# Patient Record
Sex: MALE | Race: WHITE | NOT HISPANIC OR LATINO | Employment: FULL TIME | URBAN - METROPOLITAN AREA
[De-identification: names, ages, dates, MRNs, and addresses within clinical notes are randomized per-mention and may not be internally consistent; named-entity substitution may affect disease eponyms.]

---

## 2021-03-16 ENCOUNTER — OFFICE VISIT (OUTPATIENT)
Dept: OBGYN CLINIC | Facility: MEDICAL CENTER | Age: 36
End: 2021-03-16
Payer: COMMERCIAL

## 2021-03-16 VITALS
WEIGHT: 196 LBS | HEIGHT: 61 IN | SYSTOLIC BLOOD PRESSURE: 144 MMHG | DIASTOLIC BLOOD PRESSURE: 78 MMHG | BODY MASS INDEX: 37 KG/M2 | HEART RATE: 77 BPM

## 2021-03-16 DIAGNOSIS — M54.6 ACUTE BILATERAL THORACIC BACK PAIN: Primary | ICD-10-CM

## 2021-03-16 DIAGNOSIS — M54.2 ACUTE NECK PAIN: ICD-10-CM

## 2021-03-16 DIAGNOSIS — M47.814 THORACIC SPONDYLOSIS: ICD-10-CM

## 2021-03-16 PROCEDURE — 99203 OFFICE O/P NEW LOW 30 MIN: CPT | Performed by: STUDENT IN AN ORGANIZED HEALTH CARE EDUCATION/TRAINING PROGRAM

## 2021-03-16 RX ORDER — METHYLPREDNISOLONE 4 MG/1
TABLET ORAL
COMMUNITY
Start: 2021-03-15 | End: 2022-02-10

## 2021-03-16 RX ORDER — LISINOPRIL AND HYDROCHLOROTHIAZIDE 25; 20 MG/1; MG/1
1 TABLET ORAL DAILY
COMMUNITY
Start: 2021-03-10 | End: 2022-02-10

## 2021-03-16 RX ORDER — TIZANIDINE 4 MG/1
TABLET ORAL
COMMUNITY
Start: 2021-03-15

## 2021-03-16 RX ORDER — ALPRAZOLAM 0.5 MG/1
TABLET ORAL
COMMUNITY
Start: 2021-03-06

## 2021-03-16 RX ORDER — MELOXICAM 15 MG/1
15 TABLET ORAL DAILY
Qty: 30 TABLET | Refills: 1 | Status: SHIPPED | OUTPATIENT
Start: 2021-03-16 | End: 2021-06-04 | Stop reason: SDUPTHER

## 2021-03-16 RX ORDER — ZOLPIDEM TARTRATE 10 MG/1
TABLET ORAL
COMMUNITY
Start: 2021-03-06

## 2021-03-16 NOTE — LETTER
March 16, 2021     Patient: Lei Jameson   YOB: 1985   Date of Visit: 3/16/2021       To Whom it May Concern:    Lei Jameson is under my professional care  He was seen in my office on 3/16/2021  He is restricted to 20 pound weight restriction of lifting/pushing/pulling/carrying until cleared by a physician  Please restrict him to 8 hour work days  Patient will be re-evaluated in 2-3 weeks  If you have any questions or concerns, please don't hesitate to call           Sincerely,          Robert Graves MD        CC: Lei Jameson

## 2021-03-16 NOTE — PROGRESS NOTES
1  Acute bilateral thoracic back pain  Ambulatory referral to [facility]    Nerve Stimulator (TENS Therapy Pain Relief) [facility]    meloxicam (MOBIC) 15 mg tablet   2  Acute neck pain  Ambulatory referral to [facility]    Nerve Stimulator (TENS Therapy Pain Relief) [facility]    meloxicam (MOBIC) 15 mg tablet   3  Thoracic spondylosis  Ambulatory referral to [facility]    Nerve Stimulator (TENS Therapy Pain Relief) [facility]    meloxicam (MOBIC) 15 mg tablet     Orders Placed This Encounter   Procedures    Ambulatory referral to [facility]        Imaging Studies (I personally reviewed images in PACS and report):     prior imagin  X-ray cervical spine 03/15/2021: ( images are available without report):  No acute osseous abnormalities  2  X-ray thoracic spine 03/15/2021: (images available w/o report):  Multilevel thoracic spondylosis changes as evidenced by mild anterior spur formation  Minimal dextroscoliosis of the midthoracic spine is evident  IMPRESSION:  Acute on chronic axial thoracic and cervical neck pain  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  Multilevel thoracic mild spondylosis    Contributing factors: Occupation, BMI 37    PLAN:  - Clinical exam and prior imaging reviewed with patient and his significant other today, with impression as per above  I discussed pathophysiology and treatment options in regards to back pain as per patient instructions  Patient was agreeable to referral to formal comprehensive spine physical therapy  In regards to pain control, I have changed his NSAIDs to meloxicam 15 mg p o  q d  p r n  pain  I counseled he can concurrently take acetaminophen 500-1000 mg q 6 -8 hours  Additionally I have also prescribed him a TENs machine   -  Work note provided with right restrictions as per communications  Patient will follow-up in 3 weeks for re-evaluation and potential update of work restrictions    I counseled patient that formal physical therapy of at least 6-8 weeks would be required  Return in about 3 weeks (around 4/6/2021)  CHIEF COMPLAINT:  Back pain    HPI:  Gricelda Davis is a 29 y o  male  who presents with his significant other for       Visit 03/16/2021 :  Initial evaluation back pain: patient reports has been an ongoing issue for several years but recently worsened in the past 1 week  He attributes it to an increase of baseline lifting activities involving housework with frequent lifting and twisting of his back  In addition his work also involves frequent lifting for several hours a day  He admits he always has chronic back pain but became much more severe and is located over the cervical and thoracic aspects of his back  Pain described as sharp, constant, moderate to severe intensity, nonradiating  Pain is aggravated with any twisting /bending of his back as well as lifting  He denies any numbness / tingling, weakness, bowel/ bladder incontinence, and other ROS as per below  He reports going to an urgent care in which he was prescribed a Medrol Dosepak and muscle relaxers (tizanidine) -   He reports completing the Medrol Dosepak and feels that he has slightly less intensity of pain and slightly increased back range of motion since then  He reports no relief with the muscle relaxers  He has never seen a formal PT for this issue before  Pain can interfere with his sleep at night  Review of Systems   Constitutional: Negative for chills, fever and unexpected weight change  HENT: Negative for sore throat  Respiratory: Negative for cough, shortness of breath and wheezing  Gastrointestinal: Negative for abdominal pain, nausea and vomiting  Denies bowel incontinence   Genitourinary:        Denies urinary incontinence   Musculoskeletal:        As per HPI   Skin: Negative for rash     Neurological:        As per HPI         Medical, Surgical, Family, and Social History    Past Medical History:   Diagnosis Date    Anxiety     Hypertension     Insomnia      Past Surgical History:   Procedure Laterality Date    APPENDECTOMY       Social History   Social History     Substance and Sexual Activity   Alcohol Use None     Social History     Substance and Sexual Activity   Drug Use Not on file     Social History     Tobacco Use   Smoking Status Current Every Day Smoker   Smokeless Tobacco Never Used   Tobacco Comment    vapes     History reviewed  No pertinent family history  No Known Allergies       Physical Exam  /78   Pulse 77   Ht 5' 1" (1 549 m)   Wt 88 9 kg (196 lb)   BMI 37 03 kg/m²     Constitutional:  see vital signs  Gen: well-developed, normocephalic/atraumatic, well-groomed  Eyes: No inflammation or discharge of conjunctiva or lids; sclera clear   Pharynx: no inflammation, lesion, or mass of lips  Neck: supple, no masses, non-distended  MSK: no inflammation, lesion, mass, or clubbing of nails and digits except for other than mentioned below  SKIN: no visible rashes or skin lesions  Pulmonary/Chest: Effort normal  No respiratory distress     NEURO: cranial nerves grossly intact  PSYCH:  Alert and oriented to person, place, and time; recent and remote memory intact; mood normal, no depression, anxiety, or agitation, judgment and insight good and intact     Ortho Exam  BACK EXAM:  Gait: normal, no trendelenberg gait, no antalgic gait    BACK TENDERNESS:  Spinous Processes: +C6, T1-T10  Paraspinal Muscles: +paracervical bilaterally, +parathoracic  SI Joint: no  Sacrum: no    ROM:  Flexion:45, aggravate back pain  Extension: 10, aggravates back pain  Lateral flexion: 20 bilaterally, aggravates back pain  Rotation: 15 bilaterally, aggravates back pain    DERMATOMAL SENSATION:  L1: normal   L2: normal   L3: normal   L4: normal   L5: normal   S1: normal    STRENGTH (bilateral):  Knee Extension: 5/5  Knee Flexion: 5/5  Foot Dorsiflexion: 5/5  Great Toe Extension: 5/5  Foot Plantarflexion: 5/5  Hip Flexion: 5/5  Hip Abduction: 5/5    REFLEXES:  Patellar: 2+ bilateral  Achilles: 2+ bilateral  Clonus: negative bilateral    BACK:   SUPINE STRAIGHT LEG: negative    HIP:  LOG ROLL: negative  GWEN: negative  FADIR: negative    Cervical  ROM: intact, but paracervical pain aggravated with neck flexion/extension/lateral flexion/rotation  Sensation UE Bilateral:  C5: normal  C6: normal  C7: normal  C8: normal  T1: normal  Strength UE: 5/5 elbow, wrist, fingers bilateral  Spurlings: negative

## 2021-03-25 ENCOUNTER — EVALUATION (OUTPATIENT)
Dept: PHYSICAL THERAPY | Facility: CLINIC | Age: 36
End: 2021-03-25
Payer: COMMERCIAL

## 2021-03-25 DIAGNOSIS — M47.814 THORACIC SPONDYLOSIS: ICD-10-CM

## 2021-03-25 DIAGNOSIS — M54.2 ACUTE NECK PAIN: ICD-10-CM

## 2021-03-25 DIAGNOSIS — M54.6 ACUTE BILATERAL THORACIC BACK PAIN: ICD-10-CM

## 2021-03-25 PROCEDURE — 97161 PT EVAL LOW COMPLEX 20 MIN: CPT | Performed by: PHYSICAL THERAPIST

## 2021-03-25 PROCEDURE — 97112 NEUROMUSCULAR REEDUCATION: CPT | Performed by: PHYSICAL THERAPIST

## 2021-03-25 NOTE — PROGRESS NOTES
PT Evaluation     Today's date: 3/25/2021  Patient name: Monica Clark  : 1985  MRN: [id number]  Referring provider: Alexis Murguia MD  Dx:   Encounter Diagnosis     ICD-10-CM    1  Acute bilateral thoracic back pain  M54 6 Ambulatory referral to [facility]   2  Acute neck pain  M54 2 Ambulatory referral to [facility]   3  Thoracic spondylosis  M47 814 Ambulatory referral to [facility]       Start Time: 1615  Stop Time: 1700  Total time in clinic (min): 45 minutes    Assessment  Assessment details: 29year old male patient reports to PT with acute on chronic thoracic and cervical pain  No red flags noted and patient denies numbness/tingling  Patient primary movement impairments related to his symptoms are decreased upper cervical mobility, and decreased upper and mid thoracic mobility  Patient also has decreased scapular/postural musculature strength  Patient presents to physical therapy through the [facility] comprehensive spine program and has scored moderate risk on the Start Back tool  He will benefit from skilled PT services involving mobilization and motor coordination for 2x a week for 3-4 weeks  Patient was given exercises to address his mobility impairments and educated on importance of trying to maintain good posture and body mechanics  Impairments: abnormal or restricted ROM, abnormal movement, activity intolerance, impaired physical strength, lacks appropriate home exercise program, pain with function, poor posture  and poor body mechanics    Symptom irritability: moderateUnderstanding of Dx/Px/POC: good   Prognosis: good    Goals  STG  1  Patient will be independent with completion of HEP throughout therapy  2  Patient will have at worst 7/10 pain so patient can sleep with less discomfort in 3 weeks  3  Patient will increase R cervical rotation to at least minimal restriction so patient doesn't compensate with thoracic region in 3 weeks  LTG  1   Patient will bend over without increase in symptoms in 4 weeks  2  Patient will lift without increase in symptoms in 6 weeks  Plan  Patient would benefit from: skilled physical therapy  Planned therapy interventions: joint mobilization, manual therapy, abdominal trunk stabilization, neuromuscular re-education, patient education, strengthening, stretching, body mechanics training, therapeutic activities, therapeutic exercise, home exercise program, flexibility and functional ROM exercises  Frequency: 2x week  Duration in weeks: 6  Treatment plan discussed with: patient        Subjective Evaluation    History of Present Illness  Mechanism of injury: Patient reports with thoracic and neck pain that is chronic in nature but has worsened over the last couple of weeks due to increased workload  Patient notes bending, lifting, and twisting seems to worsen his symptoms  Patient denies numbness/tingling  Patient also has difficulty sleeping  Patient also has difficulty turning his neck  Patient is unable to complete his job fully due to his symptoms  Patient also has difficulty sitting for prolonged periods  Patient would like to get back to playing hockey  Patient notes when sits with lumbar support makes his symptoms better  Pain  Current pain ratin  At best pain ratin  At worst pain ratin  Quality: dull ache and sharp  Relieving factors: change in position and medications  Aggravating factors: lifting and sitting    Treatments  Current treatment: medication and physical therapy  Patient Goals  Patient goals for therapy: decreased pain, increased motion and return to sport/leisure activities          Objective     Concurrent Complaints  Positive for disturbed sleep   Negative for night pain, dizziness, faints, headaches and nausea/motion sickness    Active Range of Motion   Cervical/Thoracic Spine       Cervical    Flexion:  WFL  Extension:  with pain Restriction level: minimal  Left rotation:  [facility] rotation:  with pain Restriction level: moderate    Thoracic    Flexion:  WFL  Extension:  with pain Restriction level: moderate  Left rotation:  Restriction level: moderate  Right rotation:  with pain Restriction level: moderate    Strength/Myotome Testing     Left Shoulder     Isolated Muscles   Lower trapezius: 3+   Middle trapezius: 3+   Serratus anterior: 3+     Right Shoulder     Isolated Muscles   Lower trapezius: 3+   Middle trapezius: 3+   Serratus anterior: 3+     General Comments:      Cervical/Thoracic Comments  Thoracic flexion AROM - less movement upper thoracic region  Hypomobile in upper and mid thoracic regions     R C1/C2 hypomobile     B shoulder PROM full and painless   Neuro Exam:     Headaches   Patient reports headaches: No               Precautions: R A/C joint sprain       Manuals 3/25            R upper cervical mobs MK            Thoracic mobs MK                                      Neuro Re-Ed             Chin tucks                                                                                           Ther Ex             R C1/C2 snag r            Supine thoracic extension over horizontal r            Prone low trap retraction r                         UBE             Supine thoracic ext over vert w/ B UE movements             Seated CT butterfly stretch              Prone mid trap T's              Ther Activity                                       Gait Training                                       Modalities

## 2021-03-30 ENCOUNTER — OFFICE VISIT (OUTPATIENT)
Dept: PHYSICAL THERAPY | Facility: CLINIC | Age: 36
End: 2021-03-30
Payer: COMMERCIAL

## 2021-03-30 DIAGNOSIS — M47.814 THORACIC SPONDYLOSIS: ICD-10-CM

## 2021-03-30 DIAGNOSIS — M54.6 ACUTE BILATERAL THORACIC BACK PAIN: Primary | ICD-10-CM

## 2021-03-30 DIAGNOSIS — M54.2 ACUTE NECK PAIN: ICD-10-CM

## 2021-03-30 PROCEDURE — 97140 MANUAL THERAPY 1/> REGIONS: CPT | Performed by: PHYSICAL THERAPIST

## 2021-03-30 PROCEDURE — 97110 THERAPEUTIC EXERCISES: CPT | Performed by: PHYSICAL THERAPIST

## 2021-03-30 NOTE — PROGRESS NOTES
Daily Note     Today's date: 3/30/2021  Patient name: Rian Reilly  : 1985  MRN: [id number]  Referring provider: Dennis Shay MD  Dx:   Encounter Diagnosis     ICD-10-CM    1  Acute bilateral thoracic back pain  M54 6    2  Acute neck pain  M54 2    3  Thoracic spondylosis  M47 814        Start Time: 1750          Subjective: Patient notes not as much pain in his mid back but also hasn't been doing as much physically at work  Objective: See treatment diary below      Assessment: Tolerated treatment well  Patient would benefit from continued PT  Treatment plan initiated for increasing cervical and thoracic mobility  Patient had relief with manuals  Plan: Progress treatment as tolerated         Precautions: R A/C joint sprain       Manuals 3/25 3/30            R upper cervical mobs [address] MK           Thoracic mobs MK supine distraction Gr V 2x, seated CT 1x w/ cav                                     Neuro Re-Ed             Chin tucks                                                                                           Ther Ex             R C1/C2 snag r            Supine thoracic extension over horizontal r            Prone low trap retraction r 20x 5" holds                         UBE  5 min bwd            Supine thoracic ext over vert w/ B UE movements  10x bear hugs, 10x snow angels, 10x punches            Seated CT butterfly stretch   10x 5" holds            Prone mid trap T's              Thoracic rotations  10x B           Ther Activity                                       Gait Training                                       Modalities

## 2021-04-06 ENCOUNTER — APPOINTMENT (OUTPATIENT)
Dept: PHYSICAL THERAPY | Facility: CLINIC | Age: 36
End: 2021-04-06
Payer: COMMERCIAL

## 2021-04-13 ENCOUNTER — OFFICE VISIT (OUTPATIENT)
Dept: PHYSICAL THERAPY | Facility: CLINIC | Age: 36
End: 2021-04-13
Payer: COMMERCIAL

## 2021-04-13 DIAGNOSIS — M54.6 ACUTE BILATERAL THORACIC BACK PAIN: Primary | ICD-10-CM

## 2021-04-13 DIAGNOSIS — M54.2 ACUTE NECK PAIN: ICD-10-CM

## 2021-04-13 DIAGNOSIS — M47.814 THORACIC SPONDYLOSIS: ICD-10-CM

## 2021-04-13 PROCEDURE — 97110 THERAPEUTIC EXERCISES: CPT | Performed by: PHYSICAL THERAPIST

## 2021-04-13 PROCEDURE — 97112 NEUROMUSCULAR REEDUCATION: CPT | Performed by: PHYSICAL THERAPIST

## 2021-04-13 NOTE — PROGRESS NOTES
Daily Note     Today's date: 2021  Patient name: Ivonne Crowley  : 1985  MRN: [id number]  Referring provider: Elin Ahumada, MD  Dx:   Encounter Diagnosis     ICD-10-CM    1  Acute bilateral thoracic back pain  M54 6    2  Acute neck pain  M54 2    3  Thoracic spondylosis  M47 814        Start Time: 1750          Subjective: Patient notes woke up wrong and has pain in L side of neck today  Notes has been busy at work and more sore but his usual sore  Notes as week goes on he gets more sore  Notes usually works with B UEs overhead or at shoulder level a lot  Objective: See treatment diary below    L UT tightness  minimal  L pec tightness minimal  L lat tightness minimal    Assessment: Tolerated treatment well  Patient would benefit from continued PT  Patient has full shoulder PROM B and full cervical ROM B except for minimal limitation in R cervical rotation  Patient had some pain with R thoracic rotation and limited in that direction but improved with AROM exercise  Most likely would also benefit from progressive overload strengthening as patient symptoms increase as week progresses at work  Plan: Progress treatment as tolerated         Precautions: R A/C joint sprain       Manuals 3/25 3/30  4/13          R upper cervical mobs [address] MK           Thoracic mobs MK supine distraction Gr V 2x, seated CT 1x w/ cav supine distraction Gr V 2x, seated CT 1x w/ cav                                    Neuro Re-Ed             Chin tucks                                                                                           Ther Ex             R C1/C2 snag r            Supine thoracic extension over horizontal r            Prone low trap retraction r 20x 5" holds                         UBE  5 min bwd  5 min bwd          Supine thoracic ext over vert w/ B UE movements  10x bear hugs, 10x snow angels, 10x punches  pec stretch 1 min B UEs out           Seated CT butterfly stretch   10x 5" holds  12x 5"holds          Prone mid trap T's              Thoracic rotations  10x B 12x           Prone press ups   10x           víctor pose   3x 20" holds           Ther Activity                                       Gait Training                                       Modalities

## 2021-04-15 ENCOUNTER — OFFICE VISIT (OUTPATIENT)
Dept: PHYSICAL THERAPY | Facility: CLINIC | Age: 36
End: 2021-04-15
Payer: COMMERCIAL

## 2021-04-15 DIAGNOSIS — M47.814 THORACIC SPONDYLOSIS: ICD-10-CM

## 2021-04-15 DIAGNOSIS — M54.2 ACUTE NECK PAIN: ICD-10-CM

## 2021-04-15 DIAGNOSIS — M54.6 ACUTE BILATERAL THORACIC BACK PAIN: Primary | ICD-10-CM

## 2021-04-15 PROCEDURE — 97112 NEUROMUSCULAR REEDUCATION: CPT

## 2021-04-15 PROCEDURE — 97110 THERAPEUTIC EXERCISES: CPT

## 2021-04-15 PROCEDURE — 97140 MANUAL THERAPY 1/> REGIONS: CPT

## 2021-04-15 NOTE — PROGRESS NOTES
Daily Note     Today's date: 4/15/2021  Patient name: Petros Sorenson  : 1985  MRN: [id number]  Referring provider: Teresa Valencia MD  Dx:   Encounter Diagnosis     ICD-10-CM    1  Acute bilateral thoracic back pain  M54 6    2  Acute neck pain  M54 2    3  Thoracic spondylosis  M47 814                   Subjective: Pt reports that his mid-back is still bothering him, his low back feels fine, and the L side of his neck hurts but he thinks he has lymph node swelling there and under arm, though he denies any other symptoms  Objective: See treatment diary below     Precautions: R A/C joint sprain     Manuals 3/25 3/30  4/13 4/15         R upper cervical mobs [facility] MK           Thoracic mobs MK supine distraction Gr V 2x, seated CT 1x w/ cav supine distraction Gr V 2x, seated CT 1x w/ cav seated CT 1x w/ cav         STM/SOR    [address]                      Neuro Re-Ed    4/15         Chin tucks    5"x20                                   Ther Ex    4/15         R C1/C2 snag r            Supine thoracic extension over horizontal r            Prone low trap retraction r 20x 5" holds   5"x20                      UBE  5 min bwd  5 min bwd 5' BW         Supine thoracic ext over vert w/ B UE movements  10x bear hugs, 10x snow angels, 10x punches  pec stretch 1 min B UEs out  1' ea:  pec stretch  hugs,  Snow helen  scissors  5"x20: punches         Seated CT butterfly stretch   10x 5" holds  12x 5"holds 5"x12         Prone mid trap T's     nv         Thoracic rotations  10x B 12x  10x ea         Prone press ups   10x  nv         víctor pose   3x 20" holds  20"x3                                                                                           Assessment: Tolerated treatment well  Patient demonstrated fatigue post treatment, exhibited good technique with therapeutic exercises and would benefit from continued PT    Plan: Continue per plan of care  Progress treatment as tolerated      Daly Morales PTA

## 2021-04-20 ENCOUNTER — OFFICE VISIT (OUTPATIENT)
Dept: PHYSICAL THERAPY | Facility: CLINIC | Age: 36
End: 2021-04-20
Payer: COMMERCIAL

## 2021-04-20 DIAGNOSIS — M54.6 ACUTE BILATERAL THORACIC BACK PAIN: Primary | ICD-10-CM

## 2021-04-20 DIAGNOSIS — M47.814 THORACIC SPONDYLOSIS: ICD-10-CM

## 2021-04-20 DIAGNOSIS — M54.2 ACUTE NECK PAIN: ICD-10-CM

## 2021-04-20 PROCEDURE — 97140 MANUAL THERAPY 1/> REGIONS: CPT

## 2021-04-20 PROCEDURE — 97110 THERAPEUTIC EXERCISES: CPT

## 2021-04-20 NOTE — PROGRESS NOTES
Daily Note     Today's date: 2021  Patient name: Mary Leon  : 1985  MRN: [id number]  Referring provider: Ayse Schwartz MD  Dx:   Encounter Diagnosis     ICD-10-CM    1  Acute bilateral thoracic back pain  M54 6                   Subjective: Pt reports primarily having L thoracic pain/discomfort along rhomboid and mid trap  Has seen doctor in regard to neck pain he was having LV, and was told this was from inflamed lymph nodes, likely d/t to an allergic reaction  Neck is better since LV  LB has also been feeling better  Objective: See treatment diary below      Assessment: Tolerated treatment well  Responds well to manual treatment  Several trigger points with TTP present along L rhomboid/mid trap today  These soft tissue restrictions began to resolve with manual STM  Progressed program with addition of prone mid trap T's  Was challenged with this progression, but was able to perform all assigned reps  Patient would benefit from continued PT to further improve strength, decrease frequency of symptoms, and maximize overall function  Plan: Continue per plan of care  Progress as able        Precautions: R A/C joint sprain     Manuals 3/25 3/30  4/13 4/15 4/20        R upper cervical mobs [address]           Thoracic mobs MK supine distraction Gr V 2x, seated CT 1x w/ cav supine distraction Gr V 2x, seated CT 1x w/ cav seated CT 1x w/ cav seated CT 1x w/ cav  MK        STM/SOR    SH L rhomboid/mid trap  AFB                     Neuro Re-Ed    4/15         Chin tucks    5"x20                                   Ther Ex    4/15         R C1/C2 snag r            Supine thoracic extension over horizontal r            Prone low trap retraction r 20x 5" holds   5"x20 5"x20                     UBE  5 min bwd  5 min bwd 5' BW 5 min bwd        Supine thoracic ext over vert w/ B UE movements  10x bear hugs, 10x snow angels, 10x punches  pec stretch 1 min B UEs out  1' ea:  pec stretch  hugs,  Snow helen  scissors  5"x20: punches 1' ea:  pec stretch  hugs,  Snow helen,  Scissors,   punches        Seated CT butterfly stretch   10x 5" holds  12x 5"holds 5"x12 5"x12        Prone mid trap T's     nv 3"x10        Thoracic rotations  10x B 12x  10x ea 5"x10 ea        Prone press ups   10x  nv 10x        víctor pose   3x 20" holds  20"x3 20"x3

## 2021-04-22 ENCOUNTER — APPOINTMENT (OUTPATIENT)
Dept: PHYSICAL THERAPY | Facility: CLINIC | Age: 36
End: 2021-04-22
Payer: COMMERCIAL

## 2021-04-27 ENCOUNTER — OFFICE VISIT (OUTPATIENT)
Dept: PHYSICAL THERAPY | Facility: CLINIC | Age: 36
End: 2021-04-27
Payer: COMMERCIAL

## 2021-04-27 DIAGNOSIS — M54.2 ACUTE NECK PAIN: ICD-10-CM

## 2021-04-27 DIAGNOSIS — M54.6 ACUTE BILATERAL THORACIC BACK PAIN: Primary | ICD-10-CM

## 2021-04-27 DIAGNOSIS — M47.814 THORACIC SPONDYLOSIS: ICD-10-CM

## 2021-04-27 PROCEDURE — 97112 NEUROMUSCULAR REEDUCATION: CPT | Performed by: PHYSICAL THERAPIST

## 2021-04-27 PROCEDURE — 97140 MANUAL THERAPY 1/> REGIONS: CPT | Performed by: PHYSICAL THERAPIST

## 2021-04-27 PROCEDURE — 97110 THERAPEUTIC EXERCISES: CPT | Performed by: PHYSICAL THERAPIST

## 2021-04-27 NOTE — PROGRESS NOTES
Daily Note     Today's date: 2021  Patient name: Joselo Kunz  : 1985  MRN: [id number]  Referring provider: Lelo Clarke MD  Dx:   Encounter Diagnosis     ICD-10-CM    1  Acute bilateral thoracic back pain  M54 6    2  Acute neck pain  M54 2    3  Thoracic spondylosis  M47 814        Start Time: 1750          Subjective: Patient reports L shoulder still bothers him a little but his neck and back are feeling better and he feels looser  Objective: See treatment diary below      Assessment: Tolerated treatment well  Exercises progressed  Patient would benefit from continued PT to further improve strength, decrease frequency of symptoms, and maximize overall function  Plan: Continue per plan of care  Progress as able        Precautions: R A/C joint sprain     Manuals 4/13 4/15 4/20 4/22       R upper cervical mobs           Thoracic mobs supine distraction Gr V 2x, seated CT 1x w/ cav seated CT 1x w/ cav seated CT 1x w/ cav  MK        STM/SOR  SH L rhomboid/mid trap  AFB MK                  Neuro Re-Ed  4/15         Chin tucks  5"x20         Serratus wall slides    nv                  Ther Ex  4/15         R C1/C2 snag           Supine thoracic extension over horizontal           Prone low trap retraction  5"x20 5"x20 20x 5" holds                   UBE 5 min bwd 5' BW 5 min bwd 5 min        Supine thoracic ext over vert w/ B UE movements pec stretch 1 min B UEs out  1' ea:  pec stretch  hugs,  Snow helen  scissors  5"x20: punches 1' ea:  pec stretch  hugs,  Snow helen,  Scissors,   punches 1' ea:  pec stretch  hugs,  Snow helen,  Scissors,   punches       Seated CT butterfly stretch  12x 5"holds 5"x12 5"x12 12x 5" holds        Prone mid trap T's   nv 3"x10 2x8 3" holds        Thoracic rotations 12x  10x ea 5"x10 ea 10x 5" holds        Prone press ups 10x  nv 10x 12x        víctor pose 3x 20" holds  20"x3 20"x3 3x20"        sidelying shoulder ER L    nv

## 2021-04-29 ENCOUNTER — APPOINTMENT (OUTPATIENT)
Dept: PHYSICAL THERAPY | Facility: CLINIC | Age: 36
End: 2021-04-29
Payer: COMMERCIAL

## 2021-05-04 ENCOUNTER — OFFICE VISIT (OUTPATIENT)
Dept: PHYSICAL THERAPY | Facility: CLINIC | Age: 36
End: 2021-05-04
Payer: COMMERCIAL

## 2021-05-04 DIAGNOSIS — M54.2 ACUTE NECK PAIN: ICD-10-CM

## 2021-05-04 DIAGNOSIS — M54.6 ACUTE BILATERAL THORACIC BACK PAIN: Primary | ICD-10-CM

## 2021-05-04 DIAGNOSIS — M47.814 THORACIC SPONDYLOSIS: ICD-10-CM

## 2021-05-04 PROCEDURE — 97112 NEUROMUSCULAR REEDUCATION: CPT

## 2021-05-04 PROCEDURE — 97140 MANUAL THERAPY 1/> REGIONS: CPT

## 2021-05-04 PROCEDURE — 97110 THERAPEUTIC EXERCISES: CPT

## 2021-05-04 NOTE — PROGRESS NOTES
Daily Note     Today's date: 2021  Patient name: Hany Stevenson  : 1985  MRN: [id number]  Referring provider: Sharon Soares MD  Dx:   Encounter Diagnosis     ICD-10-CM    1  Acute bilateral thoracic back pain  M54 6    2  Acute neck pain  M54 2    3  Thoracic spondylosis  M47 814                   Subjective: Pt reports decrease in R shoulder pain and improved neck symptoms though he does continue to note mid-back soreness  Objective: See treatment diary below     Precautions: R A/C joint sprain     Manuals 4/13 4/15 4/20 4/22 5/4      R upper cervical mobs           Thoracic mobs supine distraction Gr V 2x, seated CT 1x w/ cav seated CT 1x w/ cav seated CT 1x w/ cav  MK        STM/SOR  SH L rhomboid/mid trap  AFB  [address]                  Neuro Re-Ed  4/15   5/4      Chin tucks  5"x20         Serratus wall slides    nv                  Ther Ex  4/15   5      R C1/C2 snag           Supine thoracic extension over horizontal     2'      Prone low trap retraction  5"x20 5"x20 20x 5" holds  5"x20                 UBE 5 min bwd 5' BW 5 min bwd 5 min  4' hills, twin peaks, >50RPM      Supine thoracic ext over vert w/ B UE movements pec stretch 1 min B UEs out  1' ea:  pec stretch  hugs,  Snow helen  scissors  5"x20: punches 1' ea:  pec stretch  hugs,  Snow helen,  Scissors,   punches 1' ea:  pec stretch  hugs,  Snow helen,  Scissors,   punches 1' ea:  pec stretch  hugs,  Snow helen,  Scissors,   punches      Seated CT butterfly stretch  12x 5"holds 5"x12 5"x12 12x 5" holds        Prone mid trap T's   nv 3"x10 2x8 3" holds  5"x2x8      Thoracic rotations 12x  10x ea 5"x10 ea 10x 5" holds  5"x10      Prone press ups 10x  nv 10x 12x  12x      víctor pose 3x 20" holds  20"x3 20"x3 3x20"  20"x3      sidelying shoulder ER L    nv                                                                  Assessment: Tolerated treatment well   Patient demonstrated fatigue post treatment, exhibited good technique with therapeutic exercises and would benefit from continued PT  Plan: Continue per plan of care  Progress treatment as tolerated        Shayne Bone, PTA

## 2021-05-05 ENCOUNTER — OFFICE VISIT (OUTPATIENT)
Dept: OBGYN CLINIC | Facility: MEDICAL CENTER | Age: 36
End: 2021-05-05
Payer: COMMERCIAL

## 2021-05-05 VITALS
BODY MASS INDEX: 37 KG/M2 | HEART RATE: 80 BPM | WEIGHT: 196 LBS | HEIGHT: 61 IN | SYSTOLIC BLOOD PRESSURE: 129 MMHG | DIASTOLIC BLOOD PRESSURE: 79 MMHG

## 2021-05-05 DIAGNOSIS — G89.29 CHRONIC LEFT-SIDED THORACIC BACK PAIN: Primary | ICD-10-CM

## 2021-05-05 DIAGNOSIS — M54.2 CERVICAL PAIN (NECK): ICD-10-CM

## 2021-05-05 DIAGNOSIS — M54.6 CHRONIC LEFT-SIDED THORACIC BACK PAIN: Primary | ICD-10-CM

## 2021-05-05 PROCEDURE — 99213 OFFICE O/P EST LOW 20 MIN: CPT | Performed by: STUDENT IN AN ORGANIZED HEALTH CARE EDUCATION/TRAINING PROGRAM

## 2021-05-05 NOTE — LETTER
May 5, 2021     Patient: Uzair Ellis   YOB: 1985   Date of Visit: 5/5/2021       To Whom it May Concern:    Uzair Ellis is under my professional care  He was seen in my office on 5/5/2021  He can return to work without restrictions as this time  If you have any questions or concerns, please don't hesitate to call           Sincerely,          Lissett Thapa MD        CC: Uzair Rhonda

## 2021-05-05 NOTE — PATIENT INSTRUCTIONS
Can transition from formal physical therapy to home exercise program  Please get MRI done of your cervical spine and thoracic spine - follow up with pain management afterwards to discuss other treatment options

## 2021-05-05 NOTE — PROGRESS NOTES
1  Chronic left-sided thoracic back pain  MRI thoracic spine wo contrast    Ambulatory referral to Pain Management   2  Cervical pain (neck)  MRI cervical spine wo contrast    Ambulatory referral to Pain Management     Orders Placed This Encounter   Procedures    MRI cervical spine wo contrast    MRI thoracic spine wo contrast    Ambulatory referral to Pain Management        Imaging Studies (I personally reviewed images in PACS and report):    Prior imagin  X-ray cervical spine 03/15/2021: ( images are available without report):  No acute osseous abnormalities  2  X-ray thoracic spine 03/15/2021: (images available w/o report):  Spondylosis deformans characterized by anterior spur formation is seen primarily involving the mid and lower thoracic vertebra    IMPRESSION:  1  Chronic thoracic back pain  2  Chronic paracervical neck pain   - patient reports pain has improved to his normal baseline pain of cervical and thoracic spine that he has had for years prior to his exacerbation with interventions as per below - he prefers to pursue further treatment options with pain management    3  Multilevel mild thoracic spondylosis    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    Contributing factors: Occupation ([employer] - patient reports it involves a lot of back twisting and lifting), BMI 37    Interventions: Formal PT for 6 weeks, meloxicam/aceatminophen, TENs unit    PLAN:  Repeat X-ray next visit:  None  - Clinical exam and radiographic imaging reviewed with patient today, with impression as per above  Since last visit patient has undergone several treatment Interventions as noted above reports progressive improvement of his pain back to his baseline paracervical and thoracic pain that he has had for several years  Despite these improvements, patient wishes to pursue further treatment modalities with pain management at this time    Thus, I have ordered further imaging in the form of a cervical MRI without contrast as well as a thoracic MRI without contrast along with referral to pain management  I recommended patient follow-up with pain management after obtaining MRIs  -  In regards to physical therapy, he can transition from formal PT to a home exercise program at this time  Patient declined need for refill of meloxicam   -   Work note provided allowing him to return as tolerated without restrictions  Return for Follow up with pain management after MRI   CHIEF COMPLAINT:  Follow up neck and thoracic pain    HPI:  Jennifer Cleary is a 29 y o  male  who presents for       Visit 05/05/2021 : Follow up evaluation of neck and thoracic pain: Improved  Patient reports intensity of pain has improved back to his "normal baseline" of pain that he has had for several years prior to recent exacerbation  He reports improved back ROM  He has been compliant with formal PT for at least 6 weeks and on limited duties at work  Currently he reports pain located over left parathoracic aspect of back as well as bilateral paracervical aspects of neck  Pain described as sharp, intermittent, mild-moderate intensity, and non-radiating  He reports rare episodes of LUE numbness/tinlging  Pain is aggravated with pushing/pulling/lifting but feels it is tolerable enough to work  Denies lower extremity numbness/tingling, balance issues, bowel/bladder incontinence, and other ROS as per below  He continues to use meloxicam/aceatminophen which provides some relief, but does not want to become dependent on the medication  Denies new injuries since last visit  Review of Systems   Constitutional: Negative for chills, fever and unexpected weight change  HENT: Negative for sore throat  Respiratory: Negative for cough, shortness of breath and wheezing  Gastrointestinal: Negative for abdominal pain, nausea and vomiting          Denies bowel incontinence   Genitourinary:        Denies urinary incontinence   Musculoskeletal:        As per HPI   Skin: Negative for rash  Neurological:        As per HPI         Medical, Surgical, Family, and Social History    Past Medical History:   Diagnosis Date    Anxiety     Hypertension     Insomnia      Past Surgical History:   Procedure Laterality Date    APPENDECTOMY       Social History   Social History     Substance and Sexual Activity   Alcohol Use None     Social History     Substance and Sexual Activity   Drug Use Not on file     Social History     Tobacco Use   Smoking Status Current Every Day Smoker   Smokeless Tobacco Never Used   Tobacco Comment    vapes     History reviewed  No pertinent family history  No Known Allergies       Physical Exam  /79   Pulse 80   Ht 5' 1" (1 549 m)   Wt 88 9 kg (196 lb)   BMI 37 03 kg/m²     Constitutional:  see vital signs  Gen: obese (BMI 37), normocephalic/atraumatic, well-groomed  Eyes: No inflammation or discharge of conjunctiva or lids; sclera clear   Pharynx: no inflammation, lesion, or mass of lips  Neck: supple, no masses, non-distended  MSK: no inflammation, lesion, mass, or clubbing of nails and digits except for other than mentioned below  SKIN: no visible rashes or skin lesions  Pulmonary/Chest: Effort normal  No respiratory distress     NEURO: cranial nerves grossly intact  PSYCH:  Alert and oriented to person, place, and time; recent and remote memory intact; mood normal, no depression, anxiety, or agitation, judgment and insight good and intact     Ortho Exam  Gait: normal, no trendelenberg gait, no antalgic gait     BACK TENDERNESS:  Spinous Processes: +C6, T1-T10  Paraspinal Muscles: +paracervical bilaterally, +left sided parathoracic (less severe than prior visit)  SI Joint: no  Sacrum: no     ROM:  Flexion: 100, no pain  Extension: 30, aggravates back pain  Lateral flexion: 20 bilaterally, no pain  Rotation: 20 bilaterally, aggravates back pain     DERMATOMAL SENSATION:  L1: normal   L2: normal   L3: normal   L4: normal   L5: normal   S1: normal     STRENGTH (bilateral):  Knee Extension: 5/5  Knee Flexion: 5/5  Foot Dorsiflexion: 5/5  Great Toe Extension: 5/5  Foot Plantarflexion: 5/5  Hip Flexion: 5/5  Hip Abduction: 5/5     REFLEXES:  Patellar: 2+ bilateral  Achilles: 2+ bilateral  Clonus: negative bilateral     BACK:   SUPINE STRAIGHT LEG: negative     HIP:  LOG ROLL: negative  GWEN: negative  FADIR: negative     Cervical  ROM: intact, but paracervical pain aggravated with neck flexion/extension/lateral flexion/rotation  Sensation UE Bilateral:  C5: normal  C6: normal  C7: normal  C8: normal  T1: normal  Strength UE: 5/5 elbow, wrist, fingers bilateral  Reflexes: 2+ bicipital, tricipital, brachioradialis b/l  Spurlings: negative

## 2021-05-10 ENCOUNTER — TELEPHONE (OUTPATIENT)
Dept: OBGYN CLINIC | Facility: MEDICAL CENTER | Age: 36
End: 2021-05-10

## 2021-05-10 NOTE — TELEPHONE ENCOUNTER
Patient sees [facility]  Patient's wife called in requesting prior auth for MRI  MRI is scheduled for 5/20         Please advise,

## 2021-05-13 NOTE — TELEPHONE ENCOUNTER
Patient's girlfriend called to check the status of MRI authorization  Patient has an appointment on Thursday, 5/20 and still pending      # [id number] [phone]

## 2021-06-03 ENCOUNTER — HOSPITAL ENCOUNTER (OUTPATIENT)
Dept: MRI IMAGING | Facility: HOSPITAL | Age: 36
Discharge: HOME | End: 2021-06-03
Payer: COMMERCIAL

## 2021-06-03 DIAGNOSIS — G89.29 CHRONIC LEFT-SIDED THORACIC BACK PAIN: ICD-10-CM

## 2021-06-03 DIAGNOSIS — M54.6 CHRONIC LEFT-SIDED THORACIC BACK PAIN: ICD-10-CM

## 2021-06-03 DIAGNOSIS — M54.2 CERVICAL PAIN (NECK): ICD-10-CM

## 2021-06-03 PROCEDURE — G1004 CDSM NDSC: HCPCS

## 2021-06-03 PROCEDURE — 72146 MRI CHEST SPINE W/O DYE: CPT

## 2021-06-03 PROCEDURE — 72141 MRI NECK SPINE W/O DYE: CPT

## 2021-06-04 ENCOUNTER — CONSULT (OUTPATIENT)
Dept: PAIN MEDICINE | Facility: MEDICAL CENTER | Age: 36
End: 2021-06-04
Payer: COMMERCIAL

## 2021-06-04 ENCOUNTER — TELEPHONE (OUTPATIENT)
Dept: PAIN MEDICINE | Facility: MEDICAL CENTER | Age: 36
End: 2021-06-04

## 2021-06-04 VITALS
SYSTOLIC BLOOD PRESSURE: 131 MMHG | HEIGHT: 67 IN | DIASTOLIC BLOOD PRESSURE: 79 MMHG | WEIGHT: 199 LBS | BODY MASS INDEX: 31.23 KG/M2 | TEMPERATURE: 97.9 F | HEART RATE: 72 BPM

## 2021-06-04 DIAGNOSIS — G89.29 CHRONIC LEFT-SIDED THORACIC BACK PAIN: ICD-10-CM

## 2021-06-04 DIAGNOSIS — M54.6 ACUTE BILATERAL THORACIC BACK PAIN: ICD-10-CM

## 2021-06-04 DIAGNOSIS — M47.814 THORACIC SPONDYLOSIS: ICD-10-CM

## 2021-06-04 DIAGNOSIS — M54.59 LUMBAR FACET JOINT PAIN: ICD-10-CM

## 2021-06-04 DIAGNOSIS — M54.2 CERVICAL PAIN (NECK): ICD-10-CM

## 2021-06-04 DIAGNOSIS — G89.4 CHRONIC PAIN SYNDROME: Primary | ICD-10-CM

## 2021-06-04 DIAGNOSIS — M54.2 ACUTE NECK PAIN: ICD-10-CM

## 2021-06-04 DIAGNOSIS — M54.6 CHRONIC LEFT-SIDED THORACIC BACK PAIN: ICD-10-CM

## 2021-06-04 PROCEDURE — 99244 OFF/OP CNSLTJ NEW/EST MOD 40: CPT | Performed by: PHYSICAL MEDICINE & REHABILITATION

## 2021-06-04 RX ORDER — SILDENAFIL 100 MG/1
100 TABLET, FILM COATED ORAL DAILY PRN
COMMUNITY
Start: 2021-05-11

## 2021-06-04 RX ORDER — CETIRIZINE HYDROCHLORIDE, PSEUDOEPHEDRINE HYDROCHLORIDE 5; 120 MG/1; MG/1
1 TABLET, FILM COATED, EXTENDED RELEASE ORAL 2 TIMES DAILY
COMMUNITY

## 2021-06-04 RX ORDER — MELOXICAM 15 MG/1
15 TABLET ORAL DAILY
Qty: 30 TABLET | Refills: 1 | Status: SHIPPED | OUTPATIENT
Start: 2021-06-04 | End: 2022-02-10

## 2021-06-04 NOTE — TELEPHONE ENCOUNTER
Patient requesting prior authorization for MRI scheduled on 6/17 at 7:30 pm     Please advise    Thank you     [phone]

## 2021-06-04 NOTE — PROGRESS NOTES
Assessment  1  Chronic pain syndrome    2  Chronic left-sided thoracic back pain    3  Cervical pain (neck)    4  Lumbar facet joint pain    5  Thoracic spondylosis    6  Acute bilateral thoracic back pain    7  Acute neck pain        Plan  Mr Alison Davis is a pleasant 76-year-old male who presents for initial evaluation  Extensive conversation regarding patient's current and worse pain and patient states main pain is generating from his mid and low back  During today's evaluation he is demonstrating pain that is likely multifactorial in nature with midthoracic back pain likely in relation to the thoracic spondylosis as well as myofascial pain syndrome of the bilateral thoracic paraspinals  In regards to the low back pain he is demonstrating clinical evidence of suspected lumbar spondylosis and facet mediated right worse than left-sided back pain  He has already completed greater than 6 weeks of physical therapy in the last 6 months and continues with home exercises with minimal relief  At this time we will   1  Plan for bilateral thoracic paraspinal trigger point injections under ultrasound guidance  2  Will order lumbar x-ray and MRI without contrast to better identify disc, spine and facet mediated back pain contributing to his ongoing pain  Again he has completed greater than 6 weeks of physical therapy in the last 6 months  3  Will call patient with MRI results and plan moving forward  He would likely benefit from bilateral lumbar MBB with subsequent radiofrequency ablation  Extensive conversation regarding the procedure in office was discussed and patient wishes to proceed if imaging corresponds with clinical suspicions of facet mediated back pain  4  Will refill meloxicam as this was providing some relief for acute inflammatory changes    My impressions and treatment recommendations were discussed in detail with the patient who verbalized understanding and had no further questions    Discharge instructions were provided  I personally saw and examined the patient and I agree with the above discussed plan of care  Orders Placed This Encounter   Procedures    X-ray lumbar spine 2 or 3 views     Standing Status:   Future     Standing Expiration Date:   6/4/2025     Scheduling Instructions:      Bring along any outside films relating to this procedure   MRI lumbar spine without contrast     Standing Status:   Future     Standing Expiration Date:   6/4/2025     Scheduling Instructions: There is no preparation for this test  Please leave your jewelry and valuables at home, wedding rings are the exception  Magnetic nail polish must be removed prior to arrival for your test  Please bring your insurance cards, a form of photo ID and a list of your medications with you  Arrive 15 minutes prior to your appointment time in order to register  Please bring any prior CT or MRI studies of this area that were not performed at a [facility]  To schedule this appointment, please contact Central Scheduling at [phone]  Prior to your appointment, please make sure you complete the MRI Screening Form when you e-Check in for your appointment  This will be available starting 7 days before your appointment in [address]  You may receive an e-mail with an activation code if you do not have a [employer] account  If you do not have access to a device, we will complete your screening at your appointment  Order Specific Question:   What is the patient's sedation requirement? Answer:   No Sedation     Order Specific Question:   Release to patient through [employer]     Answer:   Immediate     Order Specific Question:   Is order priority selected as STAT?      Answer:   No     Order Specific Question:   Reason for Exam (FREE TEXT)     Answer:   lumbar facet mediated pain and lumbar radiculopathy     New Medications Ordered This Visit   Medications    sildenafil (VIAGRA) 100 mg tablet Sig: Take 100 mg by mouth daily as needed    cetirizine-pseudoephedrine (ZyrTEC-D) 5-120 MG per tablet     Sig: Take 1 tablet by mouth 2 (two) times a day    meloxicam (MOBIC) 15 mg tablet     Sig: Take 1 tablet (15 mg total) by mouth daily     Dispense:  30 tablet     Refill:  1       History of Present Illness    Talib Hemphill is a 29 y o  male presents to [place] [address] [facility] for initial evaluation regarding neck mid and low back pain of 7 years duration  Patient reports a non work related injury in February 2021  Since then has reported worsening pain  Today he reports 4 to 6/10 pain that is described as a moderate cramping, shooting, throbbing, sharp pain that is worse in the morning and evening  Admits to upper extremity weakness and dropping objects  Does not use any durable medical equipment for ambulation  Symptoms are worse with standing, bending, sitting, exercise, bowel movements  He has had moderate relief with meloxicam in the past   Currently taking over-the-counter NSAIDs including Tylenol and Motrin with minimal relief  Presents for initial evaluation  I have personally reviewed and/or updated the patient's past medical history, past surgical history, family history, social history, current medications, allergies, and vital signs today  Review of Systems   Constitutional: Negative for fever and unexpected weight change  HENT: Negative for trouble swallowing  Eyes: Negative for visual disturbance  Respiratory: Negative for shortness of breath and wheezing  Cardiovascular: Negative for chest pain and palpitations  Gastrointestinal: Negative for constipation, diarrhea, nausea and vomiting  Endocrine: Negative for cold intolerance, heat intolerance and polydipsia  Genitourinary: Negative for difficulty urinating and frequency  Musculoskeletal: Positive for back pain, joint swelling, myalgias and neck pain  Negative for arthralgias and gait problem  Skin: Negative for rash  Neurological: Negative for dizziness, seizures, syncope, weakness and headaches  Hematological: Does not bruise/bleed easily  Psychiatric/Behavioral: Negative for dysphoric mood  All other systems reviewed and are negative  There is no problem list on file for this patient  Past Medical History:   Diagnosis Date    Anxiety     Arthritis     Chronic pain     Depression     Hypertension     Insomnia        Past Surgical History:   Procedure Laterality Date    APPENDECTOMY         Family History   Problem Relation Age of Onset    No Known Problems Mother     No Known Problems Father        Social History     Occupational History    Not on file   Tobacco Use    Smoking status: Former Smoker    Smokeless tobacco: Never Used    Tobacco comment: vapes   Substance and Sexual Activity    Alcohol use:  Yes    Drug use: Yes     Types: Marijuana    Sexual activity: Yes     Partners: Female       Current Outpatient Medications on File Prior to Visit   Medication Sig    ALPRAZolam (XANAX) 0 5 mg tablet TAKE 1 TABLET BY MOUTH TWO TIMES A DAY AS NEEDED    cetirizine-pseudoephedrine (ZyrTEC-D) 5-120 MG per tablet Take 1 tablet by mouth 2 (two) times a day    lisinopril-hydrochlorothiazide (PRINZIDE,ZESTORETIC) 20-25 MG per tablet Take 1 tablet by mouth daily    sildenafil (VIAGRA) 100 mg tablet Take 100 mg by mouth daily as needed    zolpidem (AMBIEN) 10 mg tablet TAKE 1 TABLET BY MOUTH NIGHTLY *FOLLOW UP APPOINTMENT IS DUE*    methylPREDNISolone 4 MG tablet therapy pack TAKE AS DIRECTED PER PACKAGE INSTRUCTIONS    Nerve Stimulator (TENS Therapy Pain Relief) AL[place] Use 1 Device 3 (three) times a day as needed (Apply up to 15 minutes per session as needed for pain)    tiZANidine (ZANAFLEX) 4 mg tablet TAKE 1 TABLET BY MOUTH EVERY 12 HOURS FOR MUSCLE PAIN    [DISCONTINUED] meloxicam (MOBIC) 15 mg tablet Take 1 tablet (15 mg total) by mouth daily (Patient not taking: Reported on 6/4/2021)     No current facility-administered medications on file prior to visit  No Known Allergies    Physical Exam    /79   Pulse 72   Temp 97 9 °F (36 6 °C)   Ht 5' 7" (1 702 m)   Wt 90 3 kg (199 lb)   BMI 31 17 kg/m²     Constitutional: normal, well developed, well nourished, alert, in no distress and non-toxic and no overt pain behavior  Eyes: anicteric  HEENT: grossly intact  Neck: supple, symmetric, trachea midline and no masses   Pulmonary:even and unlabored  Cardiovascular:No edema or pitting edema present  Skin:Normal without rashes or lesions and well hydrated  Psychiatric:Mood and affect appropriate  Neurologic:Cranial Nerves II-XII grossly intact  Musculoskeletal:normal gait  Neck exam  Tenderness to palpation bilateral thoracic and cervical paraspinals, decreased active and passive range of motion with cervical flexion extension limited by pain, MMT 5/5 bilateral upper extremities, sensation grossly intact to light touch, Spurling negative for radicular pain into the neck  Back exam  Tenderness to palpation bilateral lumbar paraspinals, decreased active and passive range of motion lumbar flexion and extension limited by pain, MMT 5/5 bilateral extremities, sensation grossly intact to light touch, straight leg raise negative in bilateral lower extremities, positive pain to palpation bilateral lumbar facets with axial loading and rotational forces to the right and left    Imaging  Result Impression   IMPRESSION: Normal cervical spine  Workstation:[id number]   Result Narrative   History: Neck pain with proximal tightness  5 projections of the cervical spine were obtained  There is no evidence of a  cervical fracture  There are no degenerative changes  Other Result Information   Interface, Rad Results In - 03/15/2021 10:56 AM EDT  Formatting of this note might be different from the original   History: Neck pain with proximal tightness      5 projections of the cervical spine were obtained  There is no evidence of a  cervical fracture  There are no degenerative changes  IMPRESSION:  IMPRESSION: Normal cervical spine  Workstation:[id number]     Impression: Minimal spondylosis deformans and dextroscoliosis as described  above  Workstation:[id number]   Result Narrative   Exam: Thoracic spine x-rays    Clinical indication: Back pain    Comparison: None    Technique: 3 views of the thoracic spine were obtained including swimmer's view  Findings: AP and lateral views of the thoracic spine demonstrate no evidence of  fracture or dislocation  Spondylosis deformans characterized by anterior spur  formation is seen primarily involving the mid and lower thoracic vertebra  The  disc spaces appear within normal limits  Minimal dextroscoliosis of the  midthoracic spine is evident  The osseous mineralization is within normal  limits  Other Result Information   Interface, Rad Results In - 03/15/2021 10:56 AM EDT  Formatting of this note might be different from the original   Exam: Thoracic spine x-rays    Clinical indication: Back pain    Comparison: None    Technique: 3 views of the thoracic spine were obtained including swimmer's view  Findings: AP and lateral views of the thoracic spine demonstrate no evidence of  fracture or dislocation  Spondylosis deformans characterized by anterior spur  formation is seen primarily involving the mid and lower thoracic vertebra  The  disc spaces appear within normal limits  Minimal dextroscoliosis of the  midthoracic spine is evident  The osseous mineralization is within normal  limits  IMPRESSION:  Impression: Minimal spondylosis deformans and dextroscoliosis as described  above                Mikhail Silva

## 2021-06-04 NOTE — PATIENT INSTRUCTIONS
Arthritis   WHAT YOU NEED TO KNOW:   Arthritis is pain or disease in one or more joints  There are many types of arthritis  Types such as rheumatoid arthritis cause inflammation in the joints  Other types wear away the cartilage between joints, such as osteoarthritis  This makes the bones of the joint rub together when you move the joint  An infection from bacteria, a virus, or a fungus can also cause arthritis  Your symptoms may be constant, or symptoms may come and go  Arthritis often gets worse over time and can cause permanent joint damage  DISCHARGE INSTRUCTIONS:   Call your doctor or rheumatologist if:   · You have a fever and severe joint pain or swelling  · You cannot move the affected joint  · You have severe joint pain you cannot tolerate  · You have a new or worsening rash  · Your pain or swelling does not get better with treatment  · You have questions or concerns about your condition or care  Medicines:   · Acetaminophen  decreases pain and fever  It is available without a doctor's order  Ask how much to take and how often to take it  Follow directions  Read the labels of all other medicines you are using to see if they also contain acetaminophen, or ask your doctor or pharmacist  Acetaminophen can cause liver damage if not taken correctly  Do not use more than 4 grams (4,000 milligrams) total of acetaminophen in one day  · NSAIDs , such as ibuprofen, help decrease swelling, pain, and fever  This medicine is available with or without a doctor's order  NSAIDs can cause stomach bleeding or kidney problems in certain people  If you take blood thinner medicine, always ask your healthcare provider if NSAIDs are safe for you  Always read the medicine label and follow directions  · Steroids  reduce swelling and pain  · Prescription pain medicine  may be given  Ask your healthcare provider how to take this medicine safely  Some prescription pain medicines contain acetaminophen   Do not take other medicines that contain acetaminophen without talking to your healthcare provider  Too much acetaminophen may cause liver damage  Prescription pain medicine may cause constipation  Ask your healthcare provider how to prevent or treat constipation  · Take your medicine as directed  Contact your healthcare provider if you think your medicine is not helping or if you have side effects  Tell him of her if you are allergic to any medicine  Keep a list of the medicines, vitamins, and herbs you take  Include the amounts, and when and why you take them  Bring the list or the pill bottles to follow-up visits  Carry your medicine list with you in case of an emergency  Manage your symptoms:   · Rest your painful joint so it can heal   Your healthcare provider may recommend crutches or a walker if the affected joint is in a leg  · Apply ice or heat to the joint  Both can help decrease swelling and pain  Ice may also help prevent tissue damage  Use an ice pack, or put crushed ice in a plastic bag  Cover it with a towel and place it on your joint for 15 to 20 minutes every hour or as directed  You can apply heat for 20 minutes every 2 hours  Heat treatment includes hot packs or heat lamps  · Elevate your joint  Elevation helps reduce swelling and pain  Raise your joint above the level of your heart as often as you can  Prop your painful joint on pillows to keep it above your heart comfortably  Manage arthritis:   · Talk to your healthcare providers about your arthritis medicines  Some medicines may only be needed when you have arthritis pain  You may need to take other medicines every day to prevent arthritis from getting worse  Your healthcare providers will help you understand all your medicines and when to take them  It is important to take the medicines as directed, even if you start to feel better  You can continue to have joint damage and inflammation even if you do not feel it      · Eat a variety of healthy foods  Healthy foods include fruits, vegetables, whole-grain breads, low-fat dairy products, beans, lean meats, and fish  Ask if you need to be on a special diet  A diet rich in calcium and vitamin D may decrease your risk of osteoporosis  Foods high in calcium include milk, cheese, broccoli, and tofu  Vitamin D may be found in meat, fish, fortified milk, cereal and bread  Ask if you need calcium or vitamin D supplements  · Go to physical or occupational therapy as directed  A physical therapist can teach you exercises to improve flexibility and range of motion  You may also be shown non-weight-bearing exercises that are safe for your joints, such as swimming  Exercise can help keep your joints flexible and reduce pain  An occupational therapist can help you learn to do your daily activities when your joints are stiff or sore  · Maintain a healthy weight  Extra weight puts increased pressure on your joints  Ask your healthcare provider what you should weigh  If you need to lose weight, he or she can help you create a weight loss program  Weight loss can help reduce pain and increase your ability to do your activities  · Wear flat or low-heeled shoes  This will help decrease pain and reduce pressure on your ankle, knee, and hip joints  · Do not smoke  Nicotine and other chemicals in cigarettes and cigars can damage your bones and joints  Ask your healthcare provider for information if you currently smoke and need help to quit  E-cigarettes or smokeless tobacco still contain nicotine  Talk to your healthcare provider before you use these products  Support devices:   · Orthotic shoes or insoles  help support your feet when you walk  · Crutches, a cane, or a walker  may help decrease your risk for falling  They also decrease stress on affected joints  · Devices to prevent falls  include raised toilet seats and bathtub bars to help you get up from sitting   Handrails can be placed in areas where you need balance and support  · Devices to help with support and rest  include splints to wear on your hands and a firm pillow while you sleep  Use a pillow that is firm enough to support your neck and head  Follow up with your healthcare provider or rheumatologist as directed:  Write down your questions so you remember to ask them during your visits  © Copyright [address] Information is for End User's use only and may not be sold, redistributed or otherwise used for commercial purposes  All illustrations and images included in CareNotes® are the copyrighted property of [employer]  or [address] [place], [place] [address]carlos   The above information is an  only  It is not intended as medical advice for individual conditions or treatments  Talk to your doctor, nurse or pharmacist before following any medical regimen to see if it is safe and effective for you

## 2021-06-16 ENCOUNTER — TELEPHONE (OUTPATIENT)
Dept: PAIN MEDICINE | Facility: MEDICAL CENTER | Age: 36
End: 2021-06-16

## 2021-06-16 NOTE — TELEPHONE ENCOUNTER
Patients girlfriend called in to be sure trhat 6/24 procedure was cancelled- caller made aware that procedure was cancelled

## 2021-06-17 ENCOUNTER — HOSPITAL ENCOUNTER (OUTPATIENT)
Dept: MRI IMAGING | Facility: HOSPITAL | Age: 36
Discharge: HOME | End: 2021-06-17
Attending: PHYSICAL MEDICINE & REHABILITATION
Payer: COMMERCIAL

## 2021-06-17 DIAGNOSIS — G89.29 CHRONIC LEFT-SIDED THORACIC BACK PAIN: ICD-10-CM

## 2021-06-17 DIAGNOSIS — G89.4 CHRONIC PAIN SYNDROME: ICD-10-CM

## 2021-06-17 DIAGNOSIS — M47.814 THORACIC SPONDYLOSIS: ICD-10-CM

## 2021-06-17 DIAGNOSIS — M54.59 LUMBAR FACET JOINT PAIN: ICD-10-CM

## 2021-06-17 DIAGNOSIS — M54.2 CERVICAL PAIN (NECK): ICD-10-CM

## 2021-06-17 DIAGNOSIS — M54.6 CHRONIC LEFT-SIDED THORACIC BACK PAIN: ICD-10-CM

## 2021-06-17 PROCEDURE — 72148 MRI LUMBAR SPINE W/O DYE: CPT

## 2021-06-17 PROCEDURE — G1004 CDSM NDSC: HCPCS

## 2021-06-28 ENCOUNTER — TELEPHONE (OUTPATIENT)
Dept: PAIN MEDICINE | Facility: CLINIC | Age: 36
End: 2021-06-28

## 2021-06-28 NOTE — TELEPHONE ENCOUNTER
Mohamud Comes, DO  [facility]  Please notify patient of MRI lumbar spine results demonstrating completely normal findings   No significant stenosis, degenerative disc disease or disc herniations   Continue with current plan for thoracic paraspinal trigger point injections   Thank you

## 2021-06-30 NOTE — TELEPHONE ENCOUNTER
S/W pt and advised of below  Pt states he is interested in TPI, but is super busy right now  Will CB to schedule

## 2022-02-10 ENCOUNTER — OFFICE VISIT (OUTPATIENT)
Dept: OBGYN CLINIC | Facility: CLINIC | Age: 37
End: 2022-02-10
Payer: COMMERCIAL

## 2022-02-10 ENCOUNTER — TELEPHONE (OUTPATIENT)
Dept: OBGYN CLINIC | Facility: HOSPITAL | Age: 37
End: 2022-02-10

## 2022-02-10 VITALS
DIASTOLIC BLOOD PRESSURE: 82 MMHG | HEIGHT: 67 IN | SYSTOLIC BLOOD PRESSURE: 120 MMHG | TEMPERATURE: 98.2 F | WEIGHT: 215 LBS | BODY MASS INDEX: 33.74 KG/M2 | HEART RATE: 73 BPM | OXYGEN SATURATION: 98 %

## 2022-02-10 DIAGNOSIS — M10.9 ACUTE GOUT OF LEFT ANKLE, UNSPECIFIED CAUSE: ICD-10-CM

## 2022-02-10 DIAGNOSIS — M25.572 ACUTE LEFT ANKLE PAIN: Primary | ICD-10-CM

## 2022-02-10 PROCEDURE — 99214 OFFICE O/P EST MOD 30 MIN: CPT | Performed by: STUDENT IN AN ORGANIZED HEALTH CARE EDUCATION/TRAINING PROGRAM

## 2022-02-10 RX ORDER — NAPROXEN SODIUM 550 MG/1
550 TABLET ORAL
COMMUNITY
Start: 2022-02-09 | End: 2022-03-11

## 2022-02-10 RX ORDER — COLCHICINE 0.6 MG/1
0.6 TABLET ORAL DAILY
Qty: 3 TABLET | Refills: 0 | Status: SHIPPED | OUTPATIENT
Start: 2022-02-10

## 2022-02-10 RX ORDER — CEPHALEXIN 500 MG/1
CAPSULE ORAL
COMMUNITY
Start: 2022-02-09

## 2022-02-10 NOTE — TELEPHONE ENCOUNTER
Dr Tobias Lyons pharmacy called for clarification on the directions       Take 1 tablet (0 6 mg total) by mouth daily Take 2 tablets (1 2mg) by mouth once, then wait 1 hour and take 1 tablet (0 6mg) by mouth once    colchicine (COLCRYS) 0 6 mg tablet [[id number]]     Order Details  Dose: 0 6 mg Route: Oral Frequency: Daily   Dispense Quantity: 3 tablet Refills: 0            Kim from [facility] # [phone]

## 2022-02-10 NOTE — LETTER
February 10, 2022     Patient: Ariela Nash   YOB: 1985   Date of Visit: 2/10/2022       To Whom it May Concern:    Ariela Nash is under my professional care  He was seen in my office on 2/10/2022  He is restricted to sedentary duties until cleared by a physician  He will follow up within 1 week approximately for re-evaluation  If you have any questions or concerns, please don't hesitate to call           Sincerely,          Ama Mendoza MD        CC: Ariela Sharpeidalmis

## 2022-02-10 NOTE — PROGRESS NOTES
1  Acute left ankle pain  colchicine (COLCRYS) 0 6 mg tablet   2  Acute gout of left ankle, unspecified cause  colchicine (COLCRYS) 0 6 mg tablet     No orders of the defined types were placed in this encounter  Imaging Studies (I personally reviewed images in PACS):     X-ray left ankle 02/09/2022:  No acute osseous abnormalities or significant degenerative changes  There is medial ankle soft tissue swelling  IMPRESSION:   Acute left medial ankle pain/swelling w/o precipitating injury - hyperalgesic to palpation - reported increased swelling/erythema previously   Suspected gout flare   Unremarkable imaging - intact but limited ankle ROM due to medial ankle pain    Other factors:   Previously patient on lisinopril-HCTZ but states he has not used in two years   Reportedly had a "pretty bad diet" over the past 2 weeks   Denies personal/family history of gout     PLAN:     Clinical exam and radiographic imaging reviewed with patient today, with impression as per above  I have discussed with the patient the pathophysiology of this diagnosis and reviewed how the examination correlates with this diagnosis   Recommended patient to complete antibiotics from the ER previously   In regards to gout, as he reports no relief from ibuprofen - I prescribed colchicine 1 2mg now and 0 6mg one hour later tabs   Work note provided with restrictions given his symptoms   Discussed gout care/prevention - will follow up in 1 week for re-evaluation of symptoms s/p colchicine - may need to consider oral steroid if no relief  Return in about 1 week (around 2/17/2022)  Portions of the record may have been created with voice recognition software  Occasional wrong word or "sound a like" substitutions may have occurred due to the inherent limitations of voice recognition software  Read the chart carefully and recognize, using context, where substitutions have occurred       CHIEF COMPLAINT:  Chief Complaint Patient presents with    Left Ankle - Pain, Swelling         HPI:  Mariella Bond is a 40 y o  male  who presents for       Visit 2/10/2022 :  Initial evaluation of left ankle pain:  Patient reports has been an ongoing issue since yesterday after waking up around 3:00 a m  With significant/severe pain over the anterior medial aspect of his ankle  He reports swelling as well as redness of his ankle  He denies any precipitating injury the night before  He states the pain was so severe that he had difficulty weight-bearing on his left lower extremity  He also states having limited range of motion as well  He subsequently went to [facility] ER and had imaging done as noted above  He was prescribed Keflex as well as naproxen  He states he has been taking the Keflex but was unable to fill the naproxen as it was extra-strength  He has been taking ibuprofen without relief  He states currently the swelling has reduced as well as having diminished redness  He still states he has significant sensitivity on even light palpation on the inside of his ankle  He denies similar pain in the past   He denies a personal history or family history of gout  He was noted to be on lisinopril/hydrochlorothiazide blood pressure medication but he states he has not taken this in 2 years  He does admit that his diet has been very bad for the past 2 weeks  Patient denies fevers/chills, nighttime sweats  Denies nausea/vomiting, diarrhea  Denies recent illnesses          Medical, Surgical, Family, and Social History    Past Medical History:   Diagnosis Date    Anxiety     Arthritis     Chronic pain     Depression     Hypertension     Insomnia      Past Surgical History:   Procedure Laterality Date    APPENDECTOMY       Social History   Social History     Substance and Sexual Activity   Alcohol Use Yes     Social History     Substance and Sexual Activity   Drug Use Yes    Types: Marijuana     Social History     Tobacco Use Smoking Status Former Smoker   Smokeless Tobacco Never Used   Tobacco Comment    vapes     Family History   Problem Relation Age of Onset    No Known Problems Mother     No Known Problems Father      No Known Allergies       Physical Exam  /82 (BP Location: Left arm, Patient Position: Sitting, Cuff Size: Standard)   Pulse 73   Temp 98 2 °F (36 8 °C)   Ht 5' 7" (1 702 m)   Wt 97 5 kg (215 lb)   SpO2 98%   BMI 33 67 kg/m²     Constitutional:  see vital signs  Gen: well-developed, normocephalic/atraumatic, well-groomed  Eyes: No inflammation or discharge of conjunctiva or lids; sclera clear   Pulmonary/Chest: Effort normal  No respiratory distress     NEURO: cranial nerves grossly intact  PSYCH:  Alert and oriented to person, place, and time; recent and remote memory intact; mood normal, no depression, anxiety, or agitation, judgment and insight good and intact     Ortho Exam   Ankle Examination (focused):     Gait: no limp      LEFT   Inspection Erythema No erythema, but pink hue    Edema 2+ medial aspect of ankle    Ecchymosis none        ROM: (all motions aggravate pain) Plantarflexion 20    Dorsiflexion 5        Strength Pronation 4/5    Supination 4/5    Foot plantarflexion 4/5    Foot dorsflexion 4/5        TTP AiTFL no    ATFL no    CFL no    PTFL no    Achilles no    Deltoid no    Peroneal no    Tib Ant no    Tib Post no        TTP (Bony) Prox Fibula no    Lat Malleolus no    Base of 5th MT no    Med Malleolus +hyperalgesic (recoils in pain to light palpation)    Navicular no    Talar Dome no        Calcaneal Squeeze  negative   Tib-Fib Squeeze Test  negative   MT Compression  negative     No calf tenderness to palpation     LE NV Exam: +2 DP/PT pulses   Sensation intact to light touch            Procedures

## 2022-02-10 NOTE — PATIENT INSTRUCTIONS
Gout   WHAT YOU NEED TO KNOW:   Gout is a form of arthritis that causes severe joint pain, redness, swelling, and stiffness  Acute gout pain starts suddenly, gets worse quickly, and stops on its own  Acute gout can become chronic and cause permanent damage to the joints  DISCHARGE INSTRUCTIONS:   Return to the emergency department if:   · You have severe pain in one or more of your joints that you cannot tolerate  · You have a fever or redness that spreads beyond the joint area  Call your doctor if:   · You have new symptoms, such as a rash, after you start gout treatment  · Your joint pain and swelling do not go away, even after treatment  · You are not urinating as much or as often as you usually do  · You have trouble taking your gout medicines  · You have questions or concerns about your condition or care  Medicines: You may need any of the following:  · Prescription pain medicine  may be given  Ask your healthcare provider how to take this medicine safely  Some prescription pain medicines contain acetaminophen  Do not take other medicines that contain acetaminophen without talking to your healthcare provider  Too much acetaminophen may cause liver damage  Prescription pain medicine may cause constipation  Ask your healthcare provider how to prevent or treat constipation  · NSAIDs , such as ibuprofen, help decrease swelling, pain, and fever  This medicine is available with or without a doctor's order  NSAIDs can cause stomach bleeding or kidney problems in certain people  If you take blood thinner medicine, always ask your healthcare provider if NSAIDs are safe for you  Always read the medicine label and follow directions  · Gout medicine  decreases joint pain and swelling  It may also be given to prevent new gout attacks  · Steroids  reduce inflammation and can help your joint stiffness and pain during gout attacks      · Uric acid medicine  may be given to reduce the amount of uric acid your body makes  Some medicines may help you pass more uric acid when you urinate  · Take your medicine as directed  Contact your healthcare provider if you think your medicine is not helping or if you have side effects  Tell him or her if you are allergic to any medicine  Keep a list of the medicines, vitamins, and herbs you take  Include the amounts, and when and why you take them  Bring the list or the pill bottles to follow-up visits  Carry your medicine list with you in case of an emergency  Manage your symptoms:       · Rest your painful joint so it can heal   Your healthcare provider may recommend crutches or a walker if the affected joint is in a leg  · Apply ice to your joint  Ice decreases pain and swelling  Use an ice pack, or put crushed ice in a plastic bag  Cover the ice pack or bag with a towel before you apply it to your painful joint  Apply ice for 15 to 20 minutes every hour, or as directed  · Elevate your joint  Elevation helps reduce swelling and pain  Raise your joint above the level of your heart as often as you can  Prop your painful joint on pillows to keep it above your heart comfortably  · Go to physical therapy if directed  A physical therapist can teach you exercises to improve flexibility and range of motion  Help prevent gout attacks:   · Do not eat high-purine foods  These foods include meats, seafood, asparagus, spinach, cauliflower, and some types of beans  Healthcare providers may tell you to eat more low-fat milk products, such as yogurt  Milk products may decrease your risk for gout attacks  Vitamin C and coffee may also help  Your healthcare provider or dietitian can help you create a meal plan  · Drink liquids as directed  Liquids such as water help remove uric acid from your body  Ask how much liquid to drink each day and which liquids are best for you  · Maintain a healthy weight    Weight loss may decrease the amount of uric acid in your body  Ask your healthcare provider what a healthy weight is for you  Ask him or her to help you create a weight loss plan if you are overweight  · Control your blood sugar level if you have diabetes  Keep your blood sugar level in a normal range  This can help prevent gout attacks  · Limit or do not drink alcohol as directed  Alcohol can trigger a gout attack  Alcohol also increases your risk for dehydration  Ask your healthcare provider if alcohol is safe for you  Follow up with your doctor as directed: You may be referred to a rheumatologist or podiatrist  Write down your questions so you remember to ask them during your visits  © [employer] 2021 Information is for End User's use only and may not be sold, redistributed or otherwise used for commercial purposes  All illustrations and images included in CareNotes® are the copyrighted property of [employer]  or [employer]  The above information is an  only  It is not intended as medical advice for individual conditions or treatments  Talk to your doctor, nurse or pharmacist before following any medical regimen to see if it is safe and effective for you

## 2023-01-18 NOTE — TELEPHONE ENCOUNTER
Patients girlfriend , Susanna James is calling to see update on MRI Auth      C/b # [phone] , can leave a message General